# Patient Record
Sex: MALE | Race: BLACK OR AFRICAN AMERICAN | HISPANIC OR LATINO | URBAN - METROPOLITAN AREA
[De-identification: names, ages, dates, MRNs, and addresses within clinical notes are randomized per-mention and may not be internally consistent; named-entity substitution may affect disease eponyms.]

---

## 2021-11-05 ENCOUNTER — EMERGENCY (EMERGENCY)
Facility: HOSPITAL | Age: 26
LOS: 0 days | Discharge: HOME | End: 2021-11-05
Attending: EMERGENCY MEDICINE | Admitting: EMERGENCY MEDICINE
Payer: MEDICAID

## 2021-11-05 VITALS
DIASTOLIC BLOOD PRESSURE: 63 MMHG | HEART RATE: 92 BPM | OXYGEN SATURATION: 100 % | SYSTOLIC BLOOD PRESSURE: 110 MMHG | WEIGHT: 143.08 LBS | RESPIRATION RATE: 17 BRPM | TEMPERATURE: 98 F

## 2021-11-05 DIAGNOSIS — K08.89 OTHER SPECIFIED DISORDERS OF TEETH AND SUPPORTING STRUCTURES: ICD-10-CM

## 2021-11-05 DIAGNOSIS — K02.9 DENTAL CARIES, UNSPECIFIED: ICD-10-CM

## 2021-11-05 DIAGNOSIS — K03.81 CRACKED TOOTH: ICD-10-CM

## 2021-11-05 PROCEDURE — 99283 EMERGENCY DEPT VISIT LOW MDM: CPT

## 2021-11-05 RX ORDER — IBUPROFEN 200 MG
1 TABLET ORAL
Qty: 28 | Refills: 0
Start: 2021-11-05 | End: 2021-11-11

## 2021-11-05 RX ORDER — IBUPROFEN 200 MG
600 TABLET ORAL ONCE
Refills: 0 | Status: COMPLETED | OUTPATIENT
Start: 2021-11-05 | End: 2021-11-05

## 2021-11-05 RX ORDER — AMOXICILLIN 250 MG/5ML
1 SUSPENSION, RECONSTITUTED, ORAL (ML) ORAL
Qty: 30 | Refills: 0
Start: 2021-11-05 | End: 2021-11-14

## 2021-11-05 RX ORDER — AMOXICILLIN 250 MG/5ML
500 SUSPENSION, RECONSTITUTED, ORAL (ML) ORAL ONCE
Refills: 0 | Status: COMPLETED | OUTPATIENT
Start: 2021-11-05 | End: 2021-11-05

## 2021-11-05 RX ADMIN — Medication 600 MILLIGRAM(S): at 21:23

## 2021-11-05 RX ADMIN — Medication 500 MILLIGRAM(S): at 21:23

## 2021-11-05 NOTE — ED PROVIDER NOTE - PHYSICAL EXAMINATION
VITAL SIGNS: I have reviewed nursing notes and confirm.  CONSTITUTIONAL: Well-developed; well-nourished; in no acute distress.  SKIN: Skin exam is warm and dry, no acute rash.  HEAD: Normocephalic; atraumatic.  EYES: PERRL, EOM intact; conjunctiva and sclera clear.  ENT: No nasal discharge; airway clear, extremely poor dentition with numerous caries and cracked teeth, no abscess, swelling, trismus, drooling  NECK: Supple; non tender, no swelling, no lymphadenopathy  CARD: RRR, no murmur  RESP: No wheezes, rales or rhonchi.  ABD: Normal bowel sounds; soft; non-distended; non-tender; no hepatosplenomegaly.  EXT: Normal ROM. No clubbing, cyanosis or edema.  NEURO: Alert, oriented. Grossly unremarkable. No focal deficits.  PSYCH: Cooperative, appropriate.

## 2021-11-05 NOTE — ED ADULT NURSE NOTE - NSIMPLEMENTINTERV_GEN_ALL_ED
Implemented All Universal Safety Interventions:  [place] to call system. Call bell, personal items and telephone within reach. Instruct patient to call for assistance. Room bathroom lighting operational. Non-slip footwear when patient is off stretcher. Physically safe environment: no spills, clutter or unnecessary equipment. Stretcher in lowest position, wheels locked, appropriate side rails in place.

## 2021-11-05 NOTE — ED PROVIDER NOTE - PATIENT PORTAL LINK FT
You can access the FollowMyHealth Patient Portal offered by [facility] by registering at the following website: [email or web address]. By joining [employer]’s FollowMyHealth portal, you will also be able to view your health information using other applications (apps) compatible with our system.

## 2021-11-05 NOTE — ED PROVIDER NOTE - CLINICAL SUMMARY MEDICAL DECISION MAKING FREE TEXT BOX
No signs of deep soft tissue infection or abscess, started on abx, will follow up in dental clinic.    Advised on follow up, return precautions.

## 2021-11-05 NOTE — ED PROVIDER NOTE - NS ED ROS FT
Constitutional: see HPI  Cardiac: No chest pain, SOB or edema.  Respiratory: No cough or respiratory distress  ENT: see HPI  GI: No nausea, vomiting, diarrhea or abdominal pain.  : No dysuria, frequency, urgency or hematuria  MS: no pain to back or extremities, no loss of ROM, no weakness  Neuro: see HPI  Skin: No skin rash.  Except as documented in the HPI, all other systems are negative.

## 2021-11-05 NOTE — ED PROVIDER NOTE - OBJECTIVE STATEMENT
25 yo M, hx of childhood asthma, daily marijuana use here for assessment of dental pain -- notes that over the last 2 months, his 4 back molars have all cracked/crumbled and now he is having pain to the area. Pain is worse on the R, radiates to his head. No swelling, trismus, drooling, voice change, fever.     Has not had dental care in years.

## 2021-11-05 NOTE — ED PROVIDER NOTE - NSFOLLOWUPCLINICS_GEN_ALL_ED_FT
[facility]  Dental  [address] [place] [id number]  Phone: [phone]  Fax:

## 2021-11-09 ENCOUNTER — EMERGENCY (EMERGENCY)
Facility: HOSPITAL | Age: 26
LOS: 0 days | Discharge: HOME | End: 2021-11-09
Attending: EMERGENCY MEDICINE | Admitting: EMERGENCY MEDICINE
Payer: MEDICAID

## 2021-11-09 VITALS
DIASTOLIC BLOOD PRESSURE: 57 MMHG | SYSTOLIC BLOOD PRESSURE: 119 MMHG | RESPIRATION RATE: 18 BRPM | OXYGEN SATURATION: 99 % | HEART RATE: 68 BPM | TEMPERATURE: 98 F

## 2021-11-09 DIAGNOSIS — K08.89 OTHER SPECIFIED DISORDERS OF TEETH AND SUPPORTING STRUCTURES: ICD-10-CM

## 2021-11-09 DIAGNOSIS — K02.9 DENTAL CARIES, UNSPECIFIED: ICD-10-CM

## 2021-11-09 PROCEDURE — 99282 EMERGENCY DEPT VISIT SF MDM: CPT

## 2021-11-09 NOTE — ED PROVIDER NOTE - OBJECTIVE STATEMENT
Patient is a 25 yo male with no sig PMHx c/o dental pain for the past couple of days. Patient notes sharp, constant, mild, right upper dental pain, worse with eating food. Denies fever, chills, sore throat, chest pain, SOB, difficulty speaking.

## 2021-11-09 NOTE — CONSULT NOTE ADULT - SUBJECTIVE AND OBJECTIVE BOX
Patient is a 26y old  Male who presents with a chief complaint of  pain on upper left and right side.     HPI: Patient does not have a primary dental provider, pain started couple of days ago.       PAST MEDICAL & SURGICAL HISTORY:  Patient reported in the dental medical history that he has an inborn heart defect and heart murmur and require prophylactic antibiotics.    ( -  ) heart valve replacement  ( -  ) joint replacement    MEDICATIONS  (STANDING): albuterol inhaler    MEDICATIONS  (PRN): Ibuprofen 600mg and amoxicillin 500mg prescribed on November 5th	    Allergic/Immunologic:	    Allergies    Nickel, acrylic    Vital Signs Last 24 Hrs  T(C): 36.8 (09 Nov 2021 12:37), Max: 36.8 (09 Nov 2021 12:37)  T(F): 98.3 (09 Nov 2021 12:37), Max: 98.3 (09 Nov 2021 12:37)  HR: 68 (09 Nov 2021 12:37) (68 - 68)  BP: 119/57 (09 Nov 2021 12:37) (119/57 - 119/57)  BP(mean): --  RR: 18 (09 Nov 2021 12:37) (18 - 18)  SpO2: 99% (09 Nov 2021 12:37) (99% - 99%)    Last Dental Visit: << patient does not have primary dental provider   >>    EOE:  TMJ ( -  ) clicks                     ( -  ) pops                     ( -  ) crepitus             Mandible <<FROM>>             Facial bones and MOM <<grossly intact>>             ( -  ) trismus             ( -  ) lymphadenopathy             ( -  ) swelling             ( -  ) asymmetry             ( -  ) palpation             ( -  ) dyspnea             ( -  ) dysphagia             ( -  ) loss of consciousness    IOE:  <<permanent>> dentition:         <<multiple carious teeth>> OR             Teeth #1,2,15,16 are grossly decayed root remnants                hard/soft palate:  (   ) palatal torus, <<No pathology noted>>            tongue/FOM <<No pathology noted>>            labial/buccal mucosa <<No pathology noted>>           ( +  ) percussion: #1,2,15,16           ( +  ) palpation: #1,2,15,16           ( -  ) swelling            ( -  ) abscess           ( -  ) sinus tract    *DENTAL RADIOGRAPHS: 2 periapical radiographs of left and right maxillary molars.     RADIOLOGY & ADDITIONAL STUDIES:    *ASSESSMENT: Teeth # 1,2, 15,16 are nonrestorable root remnants, periapical inflammation noted.     *PLAN: Extraction of #1,2,15,16 during a scheduled Friday morning appointment. Patient advised that he needs comprehensive dental care and to schedule hygiene appointment.     RECOMMENDATIONS:  1) << Continue antibiotics course prescribed by ED, take prescribed pain medication prn  >>  2) Dental F/U with [facility] for comprehensive dental care.   3) If any difficulty swallowing/breathing, fever occur, return to ER.     Claudia Rocha DMD,  Pager #[phone]

## 2021-11-09 NOTE — ED PROVIDER NOTE - PHYSICAL EXAMINATION
CONSTITUTIONAL: Well-developed; well-nourished; in no acute distress.   SKIN: warm, dry  HEAD: Normocephalic; atraumatic.  EYES: no conjunctival injection. PERRL.   ENT: No nasal discharge; airway clear. Oropharynx clear, tooth 2 and 15 decayed and tender, tooth 11 with caries and tender, no abscess or pus seen  NECK: Supple; non tender.  CARD: S1, S2 normal; no murmurs, gallops, or rubs. Regular rate and rhythm.   RESP: No wheezes, rales or rhonchi.   LYMPH: No acute cervical adenopathy.  NEURO: Alert, oriented, grossly unremarkable  PSYCH: Cooperative, appropriate.

## 2021-11-09 NOTE — ED PROVIDER NOTE - NS ED ROS FT
Review of Systems:  •	CONSTITUTIONAL - No fever, No diaphoresis, No weight change  •	SKIN - No rash  •	HEMATOLOGIC - No abnormal bleeding or bruising  •	EYES - No eye pain, No blurred vision  •	ENT - No change in hearing, No sore throat, No neck pain, No rhinorrhea, No ear pain. + dental pain  •	RESPIRATORY - No shortness of breath, No cough  •	CARDIAC -No chest pain, No palpitations  •	GI - No abdominal pain, No nausea, No vomiting, No diarrhea, No constipation, No bright red blood per rectum or melena. No flank pain  •	MUSCULOSKELETAL - No joint paint, No swelling, No back pain  •	NEUROLOGIC - No numbness, No focal weakness, No headache, No dizziness  All other systems negative, unless specified in HPI

## 2021-11-09 NOTE — ED ADULT NURSE NOTE - OBJECTIVE STATEMENT
pt is a 27 yo male pw dental pain. pt is complaining pf paing to bilat upper back molars, sts about 1 yr ago they were chipped and continued to chip over time. currently no tooth noted to either side. no swelling/puss/drainage. pt denies fevers.
